# Patient Record
Sex: FEMALE | Race: WHITE | NOT HISPANIC OR LATINO | URBAN - METROPOLITAN AREA
[De-identification: names, ages, dates, MRNs, and addresses within clinical notes are randomized per-mention and may not be internally consistent; named-entity substitution may affect disease eponyms.]

---

## 2014-03-18 LAB
HEP C HIM: NORMAL
HIV 1&2 EXT: NORMAL

## 2019-08-22 LAB — PAP-ABSTRACT: NORMAL

## 2022-11-02 ENCOUNTER — OFFICE VISIT (OUTPATIENT)
Dept: FAMILY MEDICINE | Facility: CLINIC | Age: 37
End: 2022-11-02
Payer: COMMERCIAL

## 2022-11-02 VITALS
HEART RATE: 68 BPM | DIASTOLIC BLOOD PRESSURE: 75 MMHG | TEMPERATURE: 97.7 F | OXYGEN SATURATION: 100 % | WEIGHT: 131.25 LBS | RESPIRATION RATE: 12 BRPM | SYSTOLIC BLOOD PRESSURE: 118 MMHG

## 2022-11-02 DIAGNOSIS — A09 TRAVELER'S DIARRHEA: Primary | ICD-10-CM

## 2022-11-02 RX ORDER — PROPRANOLOL HYDROCHLORIDE 10 MG/1
10 TABLET ORAL DAILY PRN
COMMUNITY
Start: 2022-05-01

## 2022-11-02 RX ORDER — AZITHROMYCIN 500 MG/1
500 TABLET, FILM COATED ORAL DAILY
Qty: 3 TABLET | Refills: 0 | Status: SHIPPED | OUTPATIENT
Start: 2022-11-02 | End: 2022-11-05

## 2022-11-02 NOTE — PATIENT INSTRUCTIONS
Take azithromycin & imodium or pepto bismol daily for 3 days  Call if any fever, chills, blood in stool

## 2022-11-02 NOTE — PROGRESS NOTES
CC: Diarrhea (X 6 days, with cramping)      ASSESSMENT/PLAN:   Milana was seen today for diarrhea.    Diagnoses and all orders for this visit:    Traveler's diarrhea  -     azithromycin (ZITHROMAX) 500 MG tablet; Take 1 tablet (500 mg) by mouth daily for 3 days  -     Enteric Bacteria and Virus Panel by HARJINDER Stool; Future  -     Enteric Bacteria and Virus Panel by HARJINDER Stool    Persistent traveler's diarrhea, will treat with 3 days of azithromycin + Imodium or Pepto Bismol. Discussed if she develops blood in stool, fevers, chills, call us immediately. Will also check stool panel d/t outbreak of salmonella recently in traveler's returning from [place]. Worrisome signs and symptoms were discussed with Milana and she was instructed to return to the clinic for concerning symptoms or to call with questions. Follow up if symptoms worsen or do not improve.       Zena Wilkes MD  Family Medicine      SUBJECTIVE: Milana is a 37 year old female with no PMH who comes in with the following concerns:    Diarrhea. Went on vacation last week, on her 3rd day in [facility] she and her partner developed liquidy diarrhea. Lasted for 3-4 days, now has loose stools 3x/day. Came back from [facility] on Mon. Still having loose stools - not nearly as bad as it was initially but still significant. Attributes this possibly to ceviche (raw fish) at a restaurant. Did not have any beef in [place] but did have cheese.   Tried Imodium for 2 days which helped a lot. No black, tarry, or blood stools. No nausea or vomiting. 1st day - muscle ache - maybe febrile? Tylenol helped. Cramping in lower abdomen occurs w/ BM and randomly throughout the day. Having a BM 3x/day, loose. No recent antibiotic use. Her partner started antibiotics prescribed by his doctor 2 days ago and is starting to feel better.     OBJECTIVE:   /75 (BP Location: Right arm, Patient Position: Sitting, Cuff Size: Adult Regular)   Pulse 68   Temp 97.7  F (36.5   C) (Skin)   Resp 12   Wt 59.5 kg (131 lb 4 oz)   SpO2 100%   General: Alert and oriented in no acute distress.  GI: Normal bowel sounds, soft, slightly tender in lower abdomen, otherwise NT, ND.  No masses or hepatosplenomegaly appreciated.

## 2022-11-02 NOTE — NURSING NOTE
37 year old  Chief Complaint   Patient presents with     Diarrhea     X 6 days, with cramping       Blood pressure 118/75, pulse 68, temperature 97.7  F (36.5  C), temperature source Skin, resp. rate 12, weight 59.5 kg (131 lb 4 oz), SpO2 100 %. There is no height or weight on file to calculate BMI.  There is no problem list on file for this patient.      Wt Readings from Last 2 Encounters:   11/02/22 59.5 kg (131 lb 4 oz)     BP Readings from Last 3 Encounters:   11/02/22 118/75         Current Outpatient Medications   Medication     levonorgestrel (MIRENA) 20 MCG/DAY IUD     propranolol (INDERAL) 10 MG tablet     No current facility-administered medications for this visit.       Social History     Tobacco Use     Smoking status: Never     Smokeless tobacco: Never   Vaping Use     Vaping Use: Never used   Substance Use Topics     Alcohol use: Yes     Drug use: Never       Health Maintenance Due   Topic Date Due     ADVANCE CARE PLANNING  Never done     HEPATITIS B IMMUNIZATION (1 of 3 - 3-dose series) Never done     HIV SCREENING  Never done     HEPATITIS C SCREENING  Never done     PAP  Never done     YEARLY PREVENTIVE VISIT  12/12/2017       No results found for: PAP      November 2, 2022 1:35 PM

## 2022-11-03 PROCEDURE — 87506 IADNA-DNA/RNA PROBE TQ 6-11: CPT | Performed by: FAMILY MEDICINE

## 2022-11-12 ASSESSMENT — ENCOUNTER SYMPTOMS
DIZZINESS: 0
HEMATOCHEZIA: 0
FEVER: 0
ABDOMINAL PAIN: 0
WEAKNESS: 0
COUGH: 0
NAUSEA: 0
EYE PAIN: 0
SHORTNESS OF BREATH: 0
FREQUENCY: 0
SORE THROAT: 0
DYSURIA: 0
HEMATURIA: 0
CONSTIPATION: 0
CHILLS: 0
PARESTHESIAS: 0
MYALGIAS: 0
ARTHRALGIAS: 0
PALPITATIONS: 0
NERVOUS/ANXIOUS: 0
HEARTBURN: 0
JOINT SWELLING: 0
HEADACHES: 0
BREAST MASS: 0
DIARRHEA: 0

## 2022-11-15 NOTE — PROGRESS NOTES
CC: Physical and IUD (Discuss replacing vs other options has Mirena currently)      Milana is a 37 year old female who presents to clinic for an annual exam.       We reviewed and updated her medication list as necessary. Her past medical and surgical history as well as her family history were reviewed and updated as necessary.    Gynecological history:  Menstrual symptoms: irregular her whole life, currently using Mirena IUD   Last Pap:  2019, result Normal and HPV negative  History of abnormal Paps: no  Concern for STIs: no  Safety: Feels safe in relationship  Contraceptive method: Mirena placed 2016    Other health-related habits:  Tobacco: None    Alcohol: Occassional  Drug use: no   Vaccines: up to date   Hep C & HIV screening: 3/2014 - nonreactive  DM screenin2019 - A1c 5%  Lipids: 2019 -      Answers for HPI/ROS submitted by the patient on 2022  Frequency of exercise:: 2-3 days/week  Getting at least 3 servings of Calcium per day:: Yes  Diet:: Regular (no restrictions)  Taking medications regularly:: Not Applicable  Medication side effects:: Not applicable  Bi-annual eye exam:: Yes  Dental care twice a year:: NO  Sleep apnea or symptoms of sleep apnea:: None  abdominal pain: No  Blood in stool: No  Blood in urine: No  chest pain: No  chills: No  congestion: No  constipation: No  cough: No  diarrhea: No  dizziness: No  ear pain: No  eye pain: No  nervous/anxious: No  fever: No  frequency: No  genital sores: No  headaches: No  hearing loss: No  heartburn: No  arthralgias: No  joint swelling: No  peripheral edema: No  mood changes: No  myalgias: No  nausea: No  dysuria: No  palpitations: No  Skin sensation changes: No  sore throat: No  urgency: No  rash: No  shortness of breath: No  visual disturbance: No  weakness: No  pelvic pain: No  vaginal bleeding: No  vaginal discharge: No  tenderness: Yes  breast mass: No  breast discharge: No  Additional concerns today:: Yes  Duration of  "exercise:: 45-60 minutes      OBJECTIVE:   /77 (BP Location: Left arm, Patient Position: Sitting, Cuff Size: Adult Regular)   Pulse 70   Temp 98.1  F (36.7  C) (Skin)   Resp 12   Ht 1.665 m (5' 5.55\")   Wt 60.1 kg (132 lb 8 oz)   SpO2 100%   BMI 21.68 kg/m     General: Healthy female in NAD.  Cooperative and pleasant.  HEENT: Normocephalic, atraumatic. TMs normal - some cerumen in left ear canal. Supple neck, without lymphadenopathy or thyromegaly.    Breasts: No obvious masses or axillary adenopathy. Fibrocystic changes in left breast.   Lungs: CTA bilaterally.  CV: RRR, normal S1 and S2, without murmurs, rubs, or gallops appreciated.    Abdomen: Soft, NT, ND.  No masses or hepatosplenomegaly appreciated.    Extremities: WWW, without deformity, edema.  Skin: Clear without lesions or rashes.   Neuro: Grossly intact, nonfocal.  Psych: Mood and affect appropriate.      ASSESSMENT AND PLAN:   Milana was seen today for physical.    Routine general medical examination at a health care facility    Health maintenance updates:   Lipid & DM screening today.  Discussed IUD now approved for 8 years. She will keep in her current IUD. Her  is thinking about a vasectomy.     Return in about 1 year (around 11/16/2023) for physical.    Zena Wilkes MD  Family Medicine  "

## 2022-11-16 ENCOUNTER — OFFICE VISIT (OUTPATIENT)
Dept: FAMILY MEDICINE | Facility: CLINIC | Age: 37
End: 2022-11-16
Payer: COMMERCIAL

## 2022-11-16 VITALS
OXYGEN SATURATION: 100 % | HEIGHT: 66 IN | TEMPERATURE: 98.1 F | DIASTOLIC BLOOD PRESSURE: 77 MMHG | HEART RATE: 70 BPM | RESPIRATION RATE: 12 BRPM | SYSTOLIC BLOOD PRESSURE: 128 MMHG | BODY MASS INDEX: 21.29 KG/M2 | WEIGHT: 132.5 LBS

## 2022-11-16 DIAGNOSIS — Z13.1 SCREENING FOR DIABETES MELLITUS: ICD-10-CM

## 2022-11-16 DIAGNOSIS — Z00.00 ROUTINE GENERAL MEDICAL EXAMINATION AT A HEALTH CARE FACILITY: Primary | ICD-10-CM

## 2022-11-16 DIAGNOSIS — Z13.220 SCREENING FOR CHOLESTEROL LEVEL: ICD-10-CM

## 2022-11-16 LAB
CHOLEST SERPL-MCNC: 184 MG/DL
HBA1C MFR BLD: 5.1 % (ref 0–5.6)
HDLC SERPL-MCNC: 61 MG/DL
LDLC SERPL CALC-MCNC: 98 MG/DL
NONHDLC SERPL-MCNC: 123 MG/DL
TRIGL SERPL-MCNC: 123 MG/DL

## 2022-11-16 PROCEDURE — 80061 LIPID PANEL: CPT | Performed by: FAMILY MEDICINE

## 2022-11-16 NOTE — NURSING NOTE
"    37 year old  Chief Complaint   Patient presents with     Physical     IUD     Discuss replacing vs other options has Mirena currently       Blood pressure 128/77, pulse 70, temperature 98.1  F (36.7  C), temperature source Skin, resp. rate 12, height 1.665 m (5' 5.55\"), weight 60.1 kg (132 lb 8 oz), SpO2 100 %. Body mass index is 21.68 kg/m .  There is no problem list on file for this patient.      Wt Readings from Last 2 Encounters:   11/16/22 60.1 kg (132 lb 8 oz)   11/02/22 59.5 kg (131 lb 4 oz)     BP Readings from Last 3 Encounters:   11/16/22 128/77   11/02/22 118/75         Current Outpatient Medications   Medication     levonorgestrel (MIRENA) 20 MCG/DAY IUD     propranolol (INDERAL) 10 MG tablet     No current facility-administered medications for this visit.       Social History     Tobacco Use     Smoking status: Never     Passive exposure: Never     Smokeless tobacco: Never   Vaping Use     Vaping Use: Never used   Substance Use Topics     Alcohol use: Yes     Alcohol/week: 4.0 standard drinks     Types: 4 Standard drinks or equivalent per week     Drug use: Never       Health Maintenance Due   Topic Date Due     ADVANCE CARE PLANNING  Never done     HEPATITIS B IMMUNIZATION (1 of 3 - 3-dose series) Never done     HIV SCREENING  Never done     HEPATITIS C SCREENING  Never done     YEARLY PREVENTIVE VISIT  12/12/2017       No results found for: PAP      November 16, 2022 10:05 AM    " Doxepin Counseling:  Patient advised that the medication is sedating and not to drive a car after taking this medication. Patient informed of potential adverse effects including but not limited to dry mouth, urinary retention, and blurry vision.  The patient verbalized understanding of the proper use and possible adverse effects of doxepin.  All of the patient's questions and concerns were addressed.

## 2023-07-06 ENCOUNTER — TELEPHONE (OUTPATIENT)
Dept: FAMILY MEDICINE | Facility: CLINIC | Age: 38
End: 2023-07-06

## 2023-07-06 DIAGNOSIS — N63.20 MASS OF LEFT BREAST, UNSPECIFIED QUADRANT: ICD-10-CM

## 2023-07-06 DIAGNOSIS — Z12.31 ENCOUNTER FOR SCREENING MAMMOGRAM FOR BREAST CANCER: Primary | ICD-10-CM

## 2023-07-06 NOTE — TELEPHONE ENCOUNTER
Who is calling? Patient  What do they need?  Order for mammogram   Is this an insurance referral? No  Does caller need a call back? Yes   Additional Comments: lump found at office by Dr. Wilkes last fall.    Pt has now became anxious about it and would like to get a mammogram    Radha

## 2023-07-06 NOTE — TELEPHONE ENCOUNTER
LVM explaining that Dr. Wilkes has placed imaging orders and gave breast clinic's scheduling phone number.    Tyree HERNÁNDEZ, RN  07/06/23 3:20 PM

## 2023-07-20 ENCOUNTER — HOSPITAL ENCOUNTER (OUTPATIENT)
Dept: MAMMOGRAPHY | Facility: CLINIC | Age: 38
Discharge: HOME | End: 2023-07-20
Attending: FAMILY MEDICINE
Payer: COMMERCIAL

## 2023-07-20 DIAGNOSIS — Z12.31 ENCOUNTER FOR SCREENING MAMMOGRAM FOR BREAST CANCER: ICD-10-CM

## 2023-07-20 DIAGNOSIS — N63.20 MASS OF LEFT BREAST, UNSPECIFIED QUADRANT: ICD-10-CM

## 2023-07-20 PROCEDURE — 77066 DX MAMMO INCL CAD BI: CPT

## 2023-07-20 PROCEDURE — 76642 ULTRASOUND BREAST LIMITED: CPT | Mod: LT

## 2023-12-31 ENCOUNTER — HEALTH MAINTENANCE LETTER (OUTPATIENT)
Age: 38
End: 2023-12-31

## 2024-01-29 ENCOUNTER — MYC MEDICAL ADVICE (OUTPATIENT)
Dept: FAMILY MEDICINE | Facility: CLINIC | Age: 39
End: 2024-01-29

## 2024-01-29 DIAGNOSIS — Z30.430 ENCOUNTER FOR IUD INSERTION: Primary | ICD-10-CM

## 2024-01-29 RX ORDER — IBUPROFEN 600 MG/1
600 TABLET, FILM COATED ORAL EVERY 6 HOURS PRN
Qty: 30 TABLET | Refills: 0 | Status: SHIPPED | OUTPATIENT
Start: 2024-01-29 | End: 2024-06-18

## 2024-02-09 ENCOUNTER — OFFICE VISIT (OUTPATIENT)
Dept: OBGYN | Facility: CLINIC | Age: 39
End: 2024-02-09
Attending: ADVANCED PRACTICE MIDWIFE
Payer: COMMERCIAL

## 2024-02-09 VITALS
HEART RATE: 68 BPM | BODY MASS INDEX: 21.9 KG/M2 | HEIGHT: 66 IN | WEIGHT: 136.3 LBS | DIASTOLIC BLOOD PRESSURE: 73 MMHG | SYSTOLIC BLOOD PRESSURE: 113 MMHG

## 2024-02-09 DIAGNOSIS — Z30.433 ENCOUNTER FOR REMOVAL AND REINSERTION OF INTRAUTERINE CONTRACEPTIVE DEVICE: Primary | ICD-10-CM

## 2024-02-09 PROCEDURE — 58300 INSERT INTRAUTERINE DEVICE: CPT | Performed by: ADVANCED PRACTICE MIDWIFE

## 2024-02-09 PROCEDURE — 58301 REMOVE INTRAUTERINE DEVICE: CPT | Performed by: ADVANCED PRACTICE MIDWIFE

## 2024-02-09 PROCEDURE — 250N000009 HC RX 250: Performed by: ADVANCED PRACTICE MIDWIFE

## 2024-02-09 RX ORDER — BUPROPION HYDROCHLORIDE 100 MG/1
TABLET ORAL
COMMUNITY
Start: 2024-02-01

## 2024-02-09 RX ADMIN — LEVONORGESTREL 1 EACH: 13.5 INTRAUTERINE DEVICE INTRAUTERINE at 17:26

## 2024-02-09 NOTE — PROGRESS NOTES
SUBJECTIVE:    Is a pregnancy test required: No.  Was a consent obtained?  Yes    Subjective: Milana Tavares is a 39 year old No obstetric history on file. presents for IUD and desires milad type IUD.  She requests removal of the IUD because the IUD effectiveness has     Patient has been given the opportunity to ask questions about all forms of birth control, including all options appropriate for Milana Tavares. Discussed that no method of birth control, except abstinence is 100% effective against pregnancy or sexually transmitted infection.     Milana Tavares understands she may have the IUD removed at any time. IUD should be removed by a health care provider and the current IUD will be removed today.    The entire removal and insertion procedure was reviewed with the patient, including care after placement.    Today's PHQ-2 Score:       2024     4:13 PM   PHQ-2 (  [employer])   Q1: Little interest or pleasure in doing things 0   Q2: Feeling down, depressed or hopeless 0   PHQ-2 Score 0       PROCEDURE:    Premedicated with ibuprofen.  A speculum exam was performed and the cervix was visualized. The IUD string was visualized. Using ring forceps, the string  was grasped and the IUD removed intact.    Under sterile technique, cervix was visualized with speculum and prepped with Betadine solution swab x 3. Tenaculum was placed for stability. The uterus was gently straightened and sounded to 7.5 cm. IUD prepared for placement, and IUD inserted according to 's instructions without difficulty or significant ressitance, and deployed at the fundus. The strings were visualized and trimmed to 3.0 cm from the external os. Tenaculum was removed and hemostasis noted. Speculum removed.  Patient tolerated procedure well.    Lot # [id number]  Exp:     EBL: minimal    Complications: none      POST PROCEDURE:    Given 's handouts, including when to have IUD removed, list of danger  s/sx, side effects and follow up recommended. Encouraged condom use for prevention of STD. Advised to call for any fever, for prolonged or severe pain or bleeding, abnormal vaginal dischage, or unable to palpate strings. She was advised to use pain medications (ibuprofen) as needed for mild to moderate pain. Advised to follow-up in clinic in 4-6 weeks for IUD string check if unable to find strings or as directed by provider.     Discussed use of progesterone IUD through perimenopause as an option if irreg or heavy bleeding develops    Dina MANN, RYAN student  Radha Daniels, HEENA CNM  I agree with the PFSH and ROS as completed by the student, except for changes made by me. The remainder of the encounter scribed by the student. The scribed note accurately reflects my personal services and decisions made by me.  Radha Daniels DNP, ERIC, APRN       Additional Notes: Patient consent was obtained to proceed with the visit and recommended plan of care after discussion of all risks and benefits, including the risks of COVID-19 exposure. Detail Level: Simple

## 2024-02-09 NOTE — LETTER
2024       RE: Milana Tavares  [address]  [facility] [address]     Dear Colleague,    Thank you for referring your patient, Milana Tavares, to the [facility]. Please see a copy of my visit note below.    SUBJECTIVE:    Is a pregnancy test required: No.  Was a consent obtained?  Yes    Subjective: Milana Tavares is a 39 year old No obstetric history on file. presents for IUD and desires milad type IUD.  She requests removal of the IUD because the IUD effectiveness has     Patient has been given the opportunity to ask questions about all forms of birth control, including all options appropriate for Milana Tavares. Discussed that no method of birth control, except abstinence is 100% effective against pregnancy or sexually transmitted infection.     Milana Tavares understands she may have the IUD removed at any time. IUD should be removed by a health care provider and the current IUD will be removed today.    The entire removal and insertion procedure was reviewed with the patient, including care after placement.    Today's PHQ-2 Score:       2024     4:13 PM   PHQ-2 (  [employer])   Q1: Little interest or pleasure in doing things 0   Q2: Feeling down, depressed or hopeless 0   PHQ-2 Score 0       PROCEDURE:    Premedicated with ibuprofen.  A speculum exam was performed and the cervix was visualized. The IUD string was visualized. Using ring forceps, the string  was grasped and the IUD removed intact.    Under sterile technique, cervix was visualized with speculum and prepped with Betadine solution swab x 3. Tenaculum was placed for stability. The uterus was gently straightened and sounded to 7.5 cm. IUD prepared for placement, and IUD inserted according to 's instructions without difficulty or significant ressitance, and deployed at the fundus. The strings were visualized and  trimmed to 3.0 cm from the external os. Tenaculum was removed and hemostasis noted. Speculum removed.  Patient tolerated procedure well.    Lot # [id number]  Exp: 2024/sept    EBL: minimal    Complications: none      POST PROCEDURE:    Given 's handouts, including when to have IUD removed, list of danger s/sx, side effects and follow up recommended. Encouraged condom use for prevention of STD. Advised to call for any fever, for prolonged or severe pain or bleeding, abnormal vaginal dischage, or unable to palpate strings. She was advised to use pain medications (ibuprofen) as needed for mild to moderate pain. Advised to follow-up in clinic in 4-6 weeks for IUD string check if unable to find strings or as directed by provider.     Discussed use of progesterone IUD through perimenopause as an option if irreg or heavy bleeding develops    Dina MANN, RYAN student  HEENA London CNM  I agree with the PFSH and ROS as completed by the student, except for changes made by me. The remainder of the encounter scribed by the student. The scribed note accurately reflects my personal services and decisions made by me.  Radha Daniels DNP, ERIC, APRN

## 2024-02-09 NOTE — PATIENT INSTRUCTIONS
Thank you for trusting us with your care!     If you need to contact us for questions about:  Symptoms, Scheduling & Medical Questions; Non-urgent (2-3 day response) Vin message, Urgent (needing response today) [phone] (if after 3:30pm next day response)   Prescriptions: Please call your Pharmacy   Billing: Marco [phone] or  Physicians:[phone]    IUD information:     Benefits: The IUD can be 97-99% effective when carefully following directions regarding use. It can be more effective if used with additional contraception. IUD containing progestin may decrease menstrual flow and menstrual cramping.     Risks/Side Effects: include but are not limited to spotting, bleeding, hemorrhage, or anemia: cramping or pain: partial or complete expulsion of device; lost IUD strings; uterine or cervical perforation; embedding of IUD in the uterine wall; increased risk of pelvic inflammatory disease. Women who become pregnant with an IUD in place are at a higher risk for ectopic pregnancy should a pregnancy occur with an IUD in situ. There is a higher rate of miscarriage when pregnancy occurs with IUD in place.    Warning signs: Please call clinic if you have abnormal spotting or heavy bleeding, abdominal pain, dyspareunia, fever, chills, flu like symptoms, or unable to locate strings of IUD, or strings are longer or shorter than expected.    You are encouraged to use condoms for prevention of STD. You may also need back up contraception for 7 days with your IUD. You may use pain medications (ibuprofen) as needed for mild to moderate pain. Please follow-up in clinic in 4-6 weeks for IUD string check if unable to find strings or as directed by provider.

## 2024-06-07 ENCOUNTER — MYC MEDICAL ADVICE (OUTPATIENT)
Dept: OBGYN | Facility: CLINIC | Age: 39
End: 2024-06-07
Payer: COMMERCIAL

## 2024-06-17 NOTE — PROGRESS NOTES
"Subjective:  Milana is a 39 year old female that presents today requesting an IUD string check.    On 2/9/2024 Syeda had her Mirena IUD removed and a Nallely IUD placed. Through March she had bleeding every day. Since that time she has had a resumption of her regular menses with a cycle lasting 27-30 days, 4-5 days of bleeding that is \"medium to light\". (Previously did not have menses with the Mirena IUD). She reports spotting between cycles now about every other month. She has also noticed a few times of small amount of bright red bleeding after intercourse, just noticed when wiping (scant in amount). She does not recall exactly when this started, but around February. She is not having abdominal pain, dyspareunia, vaginal discharge, or changes to bowel or bladder habits. Denies risk for STDs. Syeda has been tracking her menses with an astrid.     Acne:  Syeda also reports that since February she has some mild acne with her menstrual cycle that appears on her cheeks. She has not had acne since she was a teenager.     Preventative Care:  Mammogram 7/20/23:  IMPRESSION: BI-RADS CATEGORY: 2 - Benign Finding(s).  1. Dense fibroglandular tissue in the left lower breast. No  sonographic or mammographic abnormality in the left lower breast.  2. No mammographic evidence of malignancy in the right breast.     NIL pap in August, 2019    ROS:  General: none  Gastrointestinal: No diarrhea or constipation, no abdominal pain.   Breast: none  Genitourinary: postcoital bleeding and intermenstrual spotting. No vaginal discharge or itching. No urinary urgency or frequency.   Sexual Function: none, no dyspareunia  Skin: none    OB/GYN HISTORY:   OB History   No obstetric history on file.     PAST MEDICAL HISTORY:  No past medical history on file.    Life Style Modifiers:   Tobacco:  reports that she has never smoked. She has never been exposed to tobacco smoke. She has never used smokeless tobacco.   Alcohol:  reports current " alcohol use of about 4.0 standard drinks of alcohol per week.   Drug use:  reports no history of drug use.    PAST SURGICAL HISTORY:  No past surgical history on file.    FAMILY HISTORY:  Family History   Problem Relation Age of Onset    Skin Cancer Mother     Heart Disease Father     Parkinsonism Father     No Known Problems Sister     No Known Problems Sister     Lymphoma Maternal Uncle     Diabetes No family hx of        SOCIAL HISTORY:  Social History     Socioeconomic History    Marital status:      Spouse name: Not on file    Number of children: Not on file    Years of education: Not on file    Highest education level: Not on file   Occupational History    Not on file   Tobacco Use    Smoking status: Never     Passive exposure: Never    Smokeless tobacco: Never   Vaping Use    Vaping status: Never Used   Substance and Sexual Activity    Alcohol use: Yes     Alcohol/week: 4.0 standard drinks of alcohol     Types: 4 Standard drinks or equivalent per week    Drug use: Never    Sexual activity: Yes     Partners: Male     Birth control/protection: I.U.D.   Other Topics Concern    Not on file   Social History Narrative    Not on file     Social Determinants of Health     Financial Resource Strain: High Risk (12/28/2021)    Received from [email or web address]    Financial Resource Strain     Difficulty of Paying Living Expenses: Not on file     Difficulty of Paying Living Expenses: Not on file   Food Insecurity: Not on file   Transportation Needs: Not on file   Physical Activity: Not on file   Stress: Not on file   Social Connections: Unknown (12/28/2021)    Received from [email or web address]    Social Connections     Frequency of Communication with Friends and Family: Not on file   Interpersonal Safety: Not on file   Housing Stability: Not on file       MEDICATIONS:  Current Outpatient Medications   Medication Sig Dispense Refill    buPROPion (WELLBUTRIN) 100 MG  tablet       ibuprofen (ADVIL/MOTRIN) 600 MG tablet Take 1 tablet (600 mg) by mouth every 6 hours as needed for moderate pain 30 tablet 0    levonorgestrel (MIRENA) 20 MCG/DAY IUD       levonorgestrel (NALLELY) 13.5 MG IUD 1 each by Intrauterine route once      propranolol (INDERAL) 10 MG tablet Take 10 mg by mouth daily as needed         ALLERGIES:  Dramamine    OBJECTIVE:  Blood pressure 115/69, pulse 70, weight 59 kg (130 lb).  General: pleasant female in no acute distress  Psych: normal mentation, well oriented  Respiratory:  Unlabored breathing  Musculoskeletal: no gross deformities   Pelvic exam: normal vagina and vulva, vaginal discharge described as white, creamy, and odorless, normal cervix without lesions or tenderness, pap smear done. IUD strings visualized extending from external cervical os, approximately 3cm in length.   Diagnoses and associated orders for this visit:  Intrauterine device surveillance    PCB (post coital bleeding)    Screening for cervical cancer  -     Gynecologic Cytology (Pap) and HPV    IUD strings visualized on speculum exam today, no evidence of displacement.   Counseled pt that unscheduled bleeding/ spotting is a common SE of the Nallely IUD. Post-coital bleeding is likely due to the Nallely IUD as well, given that it started at the time of Mirena IUD removal and Nallely insertion. Offered pelvic ultrasound to evaluate for other structural causes of post-coital bleeding vs expectant management for another 2-3 months.  Syeda desires to continue monitoring her bleeding and to return for an ultrasound if unresolved after 2-3 months.     Health Maintenance reviewed:  Pap collected today  Mammogram screening discussed, recommend screening this year.      Pt left, stable. She expressed understanding and agreement with the plan for care.    I, Denisha Samayoa DNP student, completed the PFSH and ROS. I then acted as a scribe for MACKENZIE Devries, for the remainder of the visit.    I agree  with the PFSH and ROS as completed by the WHNP Student, except for changes made by me.  The remainder of the encounter was performed by me and scribed by the WHNP Student.  The scribed note accurately reflects my personal services and decisions made by me.  Darleen Reese, DNP, APRN, LÓPEZNP

## 2024-06-18 ENCOUNTER — OFFICE VISIT (OUTPATIENT)
Dept: OBGYN | Facility: CLINIC | Age: 39
End: 2024-06-18
Attending: NURSE PRACTITIONER
Payer: COMMERCIAL

## 2024-06-18 VITALS
SYSTOLIC BLOOD PRESSURE: 115 MMHG | DIASTOLIC BLOOD PRESSURE: 69 MMHG | BODY MASS INDEX: 21.27 KG/M2 | HEART RATE: 70 BPM | WEIGHT: 130 LBS

## 2024-06-18 DIAGNOSIS — Z30.431 INTRAUTERINE DEVICE SURVEILLANCE: Primary | ICD-10-CM

## 2024-06-18 DIAGNOSIS — Z12.4 SCREENING FOR CERVICAL CANCER: ICD-10-CM

## 2024-06-18 DIAGNOSIS — N93.0 PCB (POST COITAL BLEEDING): ICD-10-CM

## 2024-06-18 PROCEDURE — 87624 HPV HI-RISK TYP POOLED RSLT: CPT | Performed by: NURSE PRACTITIONER

## 2024-06-18 PROCEDURE — 99214 OFFICE O/P EST MOD 30 MIN: CPT | Performed by: NURSE PRACTITIONER

## 2024-06-18 PROCEDURE — G0145 SCR C/V CYTO,THINLAYER,RESCR: HCPCS | Performed by: NURSE PRACTITIONER

## 2024-06-18 PROCEDURE — 99213 OFFICE O/P EST LOW 20 MIN: CPT | Performed by: NURSE PRACTITIONER

## 2024-06-18 NOTE — LETTER
"6/18/2024       RE: Milana Tavares  [address] [facility] [address]     Dear Colleague,    Thank you for referring your patient, Milana Tavares, to the [facility] [place] [facility]. Please see a copy of my visit note below.    Subjective:  Milana is a 39 year old female that presents today requesting an IUD string check.    On 2/9/2024 Syeda had her Mirena IUD removed and a Nallely IUD placed. Through March she had bleeding every day. Since that time she has had a resumption of her regular menses with a cycle lasting 27-30 days, 4-5 days of bleeding that is \"medium to light\". (Previously did not have menses with the Mirena IUD). She reports spotting between cycles now about every other month. She has also noticed a few times of small amount of bright red bleeding after intercourse, just noticed when wiping (scant in amount). She does not recall exactly when this started, but around February. She is not having abdominal pain, dyspareunia, vaginal discharge, or changes to bowel or bladder habits. Denies risk for STDs. Syeda has been tracking her menses with an astrid.     Acne:  Syeda also reports that since February she has some mild acne with her menstrual cycle that appears on her cheeks. She has not had acne since she was a teenager.     Preventative Care:  Mammogram 7/20/23:  IMPRESSION: BI-RADS CATEGORY: 2 - Benign Finding(s).  1. Dense fibroglandular tissue in the left lower breast. No  sonographic or mammographic abnormality in the left lower breast.  2. No mammographic evidence of malignancy in the right breast.     NIL pap in August, 2019    ROS:  General: none  Gastrointestinal: No diarrhea or constipation, no abdominal pain.   Breast: none  Genitourinary: postcoital bleeding and intermenstrual spotting. No vaginal discharge or itching. No urinary urgency or frequency.   Sexual Function: none, no dyspareunia  Skin: " none    OB/GYN HISTORY:   OB History   No obstetric history on file.     PAST MEDICAL HISTORY:  No past medical history on file.    Life Style Modifiers:   Tobacco:  reports that she has never smoked. She has never been exposed to tobacco smoke. She has never used smokeless tobacco.   Alcohol:  reports current alcohol use of about 4.0 standard drinks of alcohol per week.   Drug use:  reports no history of drug use.    PAST SURGICAL HISTORY:  No past surgical history on file.    FAMILY HISTORY:  Family History   Problem Relation Age of Onset    Skin Cancer Mother     Heart Disease Father     Parkinsonism Father     No Known Problems Sister     No Known Problems Sister     Lymphoma Maternal Uncle     Diabetes No family hx of        SOCIAL HISTORY:  Social History     Socioeconomic History    Marital status:      Spouse name: Not on file    Number of children: Not on file    Years of education: Not on file    Highest education level: Not on file   Occupational History    Not on file   Tobacco Use    Smoking status: Never     Passive exposure: Never    Smokeless tobacco: Never   Vaping Use    Vaping status: Never Used   Substance and Sexual Activity    Alcohol use: Yes     Alcohol/week: 4.0 standard drinks of alcohol     Types: 4 Standard drinks or equivalent per week    Drug use: Never    Sexual activity: Yes     Partners: Male     Birth control/protection: I.U.D.   Other Topics Concern    Not on file   Social History Narrative    Not on file     Social Determinants of Health     Financial Resource Strain: High Risk (12/28/2021)    Received from [facility]    Financial Resource Strain     Difficulty of Paying Living Expenses: Not on file     Difficulty of Paying Living Expenses: Not on file   Food Insecurity: Not on file   Transportation Needs: Not on file   Physical Activity: Not on file   Stress: Not on file   Social Connections: Unknown (12/28/2021)    [facility]    Social Connections     Frequency of Communication with Friends and Family: Not on file   Interpersonal Safety: Not on file   Housing Stability: Not on file       MEDICATIONS:  Current Outpatient Medications   Medication Sig Dispense Refill    buPROPion (WELLBUTRIN) 100 MG tablet       ibuprofen (ADVIL/MOTRIN) 600 MG tablet Take 1 tablet (600 mg) by mouth every 6 hours as needed for moderate pain 30 tablet 0    levonorgestrel (MIRENA) 20 MCG/DAY IUD       levonorgestrel (DAVIE) 13.5 MG IUD 1 each by Intrauterine route once      propranolol (INDERAL) 10 MG tablet Take 10 mg by mouth daily as needed         ALLERGIES:  Dramamine    OBJECTIVE:  Blood pressure 115/69, pulse 70, weight 59 kg (130 lb).  General: pleasant female in no acute distress  Psych: normal mentation, well oriented  Respiratory:  Unlabored breathing  Musculoskeletal: no gross deformities   Pelvic exam: normal vagina and vulva, vaginal discharge described as white, creamy, and odorless, normal cervix without lesions or tenderness, pap smear done. IUD strings visualized extending from external cervical os, approximately 3cm in length.   Diagnoses and associated orders for this visit:  Intrauterine device surveillance    PCB (post coital bleeding)    Screening for cervical cancer  -     Gynecologic Cytology (Pap) and HPV    IUD strings visualized on speculum exam today, no evidence of displacement.   Counseled pt that unscheduled bleeding/ spotting is a common SE of the Davie IUD. Post-coital bleeding is likely due to the Davie IUD as well, given that it started at the time of Mirena IUD removal and Davie insertion. Offered pelvic ultrasound to evaluate for other structural causes of post-coital bleeding vs expectant management for another 2-3 months.  Syeda desires to continue monitoring her bleeding and to return for an ultrasound if unresolved after 2-3 months.     Health Maintenance reviewed:  Pap collected  today  Mammogram screening discussed, recommend screening this year.      Pt left, stable. She expressed understanding and agreement with the plan for care.    I, Denisha Samayoa DNP student, completed the PFSH and ROS. I then acted as a scribe for MACKENZIE Devries, for the remainder of the visit.    I agree with the PFSH and ROS as completed by the MACKENZIE Student, except for changes made by me.  The remainder of the encounter was performed by me and scribed by the MACKENZIE Student.  The scribed note accurately reflects my personal services and decisions made by me.  Darleen Reese, RYAN, APRN, MACKENZIE

## 2024-06-18 NOTE — PATIENT INSTRUCTIONS
Thank you for trusting us with your care!     If you need to contact us for questions about:  Symptoms, Scheduling & Medical Questions; Non-urgent (2-3 day response) Vin message, Urgent (needing response today) [phone] (if after 3:30pm next day response)   Prescriptions: Please call your Pharmacy   Billing: Marco [phone] or [facility]:[phone]

## 2024-06-19 LAB
HPV HR 12 DNA CVX QL NAA+PROBE: NEGATIVE
HPV16 DNA CVX QL NAA+PROBE: NEGATIVE
HPV18 DNA CVX QL NAA+PROBE: NEGATIVE
HUMAN PAPILLOMA VIRUS FINAL DIAGNOSIS: NORMAL

## 2024-06-21 LAB
BKR LAB AP GYN ADEQUACY: NORMAL
BKR LAB AP GYN INTERPRETATION: NORMAL
BKR LAB AP PREVIOUS ABNORMAL: NORMAL
PATH REPORT.COMMENTS IMP SPEC: NORMAL
PATH REPORT.COMMENTS IMP SPEC: NORMAL
PATH REPORT.RELEVANT HX SPEC: NORMAL

## 2024-09-09 ENCOUNTER — HOSPITAL ENCOUNTER (OUTPATIENT)
Dept: MAMMOGRAPHY | Facility: CLINIC | Age: 39
Discharge: HOME | End: 2024-09-09
Attending: FAMILY MEDICINE | Admitting: FAMILY MEDICINE
Payer: COMMERCIAL

## 2024-09-09 DIAGNOSIS — Z12.31 VISIT FOR SCREENING MAMMOGRAM: ICD-10-CM

## 2024-09-09 PROCEDURE — 77063 BREAST TOMOSYNTHESIS BI: CPT

## 2024-12-12 ENCOUNTER — OFFICE VISIT (OUTPATIENT)
Dept: FAMILY MEDICINE | Facility: CLINIC | Age: 39
End: 2024-12-12
Payer: COMMERCIAL

## 2024-12-12 VITALS
TEMPERATURE: 97.9 F | HEART RATE: 65 BPM | RESPIRATION RATE: 16 BRPM | SYSTOLIC BLOOD PRESSURE: 117 MMHG | DIASTOLIC BLOOD PRESSURE: 78 MMHG | OXYGEN SATURATION: 99 %

## 2024-12-12 DIAGNOSIS — H66.011 NON-RECURRENT ACUTE SUPPURATIVE OTITIS MEDIA OF RIGHT EAR WITH SPONTANEOUS RUPTURE OF TYMPANIC MEMBRANE: Primary | ICD-10-CM

## 2024-12-12 RX ORDER — VILAZODONE HYDROCHLORIDE 10 MG/1
10 TABLET ORAL DAILY
COMMUNITY
Start: 2024-09-08

## 2024-12-12 RX ORDER — AMOXICILLIN 875 MG/1
875 TABLET, COATED ORAL 2 TIMES DAILY
Qty: 20 TABLET | Refills: 0 | Status: SHIPPED | OUTPATIENT
Start: 2024-12-12 | End: 2024-12-22

## 2024-12-12 ASSESSMENT — PAIN SCALES - GENERAL: PAINLEVEL_OUTOF10: NO PAIN (0)

## 2024-12-12 NOTE — NURSING NOTE
"39 year old    Chief Complaint   Patient presents with    Ear Problem     She has liquid coming out of it and crusty build up, no pain, started three weeks ago, has gotten slightly better, is just the right ear        Blood pressure 117/78, pulse 65, temperature 97.9  F (36.6  C), temperature source Skin, resp. rate 16, SpO2 99%. There is no height or weight on file to calculate BMI.    There is no problem list on file for this patient.         Wt Readings from Last 2 Encounters:   06/18/24 59 kg (130 lb)   02/09/24 61.8 kg (136 lb 4.8 oz)       BP Readings from Last 3 Encounters:   12/12/24 117/78   06/18/24 115/69   02/09/24 113/73             Current Outpatient Medications   Medication Sig Dispense Refill    buPROPion (WELLBUTRIN) 100 MG tablet       levonorgestrel (DAVIE) 13.5 MG IUD 1 each by Intrauterine route once      propranolol (INDERAL) 10 MG tablet Take 10 mg by mouth daily as needed      vilazodone (VIIBRYD) 10 MG TABS tablet Take 10 mg by mouth daily.       No current facility-administered medications for this visit.          Social History     Tobacco Use    Smoking status: Never     Passive exposure: Never    Smokeless tobacco: Never   Vaping Use    Vaping status: Never Used   Substance Use Topics    Alcohol use: Yes     Alcohol/week: 4.0 standard drinks of alcohol     Types: 4 Standard drinks or equivalent per week    Drug use: Never          Health Maintenance Due   Topic Date Due    ADVANCE CARE PLANNING  Never done    GLUCOSE  Never done    HEPATITIS B IMMUNIZATION (1 of 3 - 19+ 3-dose series) Never done    YEARLY PREVENTIVE VISIT  11/16/2023        No results found for: \"PAP\"        December 12, 2024 4:23 PM        "

## 2024-12-12 NOTE — PROGRESS NOTES
"    [facility]  [address]  [facility] [address]  Phone: [phone]  Fax: [phone]    Patient:  Milana Tavares 1985  Date of Visit:  4:36 PM  Referring Provider Referred Self      Assessment & Plan    (H66.011) Non-recurrent acute suppurative otitis media of right ear with spontaneous rupture of tympanic membrane  (primary encounter diagnosis)  Comment: R TM is thickened with purulent material in canal, cannot seen entire TM but suspicious of perforation.   Plan: amoxicillin (AMOXIL) 875 MG tablet        Treat with amoxicillin, she is moving to [place] in the next month. Recommend follow up appt with provider in [place] in 4-6 wks for recheck. No swimming or submersion of head under water until that time. Flonase nasal spray for ear fullness, congestion in interim     Chloe Adams MD       Milana Tavares is a 39 year old female here for the following issues:    Right ear discomfort  3 wk hx of right ear fullness, \"Liquid coming out of ear, Crusty wax \"  Nontender, Non swimmer  Hx of \"lots of wax, removed at times\"  Uses Debrox occasionally, none x 6 mos  No use of Q tips  Wears ear plugs  Lots of airline travel    There is no problem list on file for this patient.      Current Outpatient Medications   Medication Sig Dispense Refill    buPROPion (WELLBUTRIN) 100 MG tablet       levonorgestrel (DAVIE) 13.5 MG IUD 1 each by Intrauterine route once      propranolol (INDERAL) 10 MG tablet Take 10 mg by mouth daily as needed      vilazodone (VIIBRYD) 10 MG TABS tablet Take 10 mg by mouth daily.         Allergies   Allergen Reactions    Dramamine Visual Disturbance        EXAM  /78 (BP Location: Left arm, Patient Position: Sitting, Cuff Size: Adult Regular)   Pulse 65   Temp 97.9  F (36.6  C) (Skin)   Resp 16   SpO2 99%   Gen: Alert, pleasant, NAD  HEENT:  Conjunctiva nl, OP clear, no posterior erythema  L canal and " TM normal  R ear canal with purulent material at lower canal, TM is thickened, pink, distorted landmarks, cannot visualize entire TM  No pain with palpation over external tragus  COR: S1,S2, no murmur  Lungs: CTA bilaterally, no rhonchi, wheezes or rales

## 2024-12-12 NOTE — PATIENT INSTRUCTIONS
Afrin nasal spray only on the day of the flight    Flonase nasal spray  Fullness, pressure last several days  Spray onto the affected side  Not habit forming     Using a corticosteroid nasal spray    Steps for using a pump bottle:  Gently blow your nose to clear it of mucus before using the medicine.  Remove the cap. Shake the bottle. The first time you use the pump spray each day, you may have to  prime  it. Do this by squirting a few times into the air until a fine mist comes out.  Tilt your head forward slightly. Breathe out slowly.  Hold the pump bottle with your thumb at the bottom and your index and middle fingers on top. Insert the tip in your nose, aiming the tip toward the back of your head. Use a finger on your other hand to close your nostril on the side not receiving the medicine.  Squeeze the pump as you begin to breathe in slowly through your nose. Repeat these steps for the other nostril. If you are using more than one spray in each nostril, follow all these steps again.  Try not to sneeze or blow your nose just after using the spray.  Helpful hints:  Remember, it may take up to 2 weeks of using a nasal steroid spray before you notice the full effects.